# Patient Record
Sex: MALE | Race: BLACK OR AFRICAN AMERICAN
[De-identification: names, ages, dates, MRNs, and addresses within clinical notes are randomized per-mention and may not be internally consistent; named-entity substitution may affect disease eponyms.]

---
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
## 2023-02-16 ENCOUNTER — HOSPITAL ENCOUNTER (EMERGENCY)
Dept: HOSPITAL 74 - JER | Age: 18
Discharge: HOME | End: 2023-02-16
Payer: OTHER GOVERNMENT

## 2023-02-16 VITALS — SYSTOLIC BLOOD PRESSURE: 118 MMHG | TEMPERATURE: 99.9 F | DIASTOLIC BLOOD PRESSURE: 69 MMHG

## 2023-02-16 VITALS — BODY MASS INDEX: 19.2 KG/M2

## 2023-02-16 VITALS — HEART RATE: 91 BPM | RESPIRATION RATE: 16 BRPM

## 2023-02-16 DIAGNOSIS — J03.90: Primary | ICD-10-CM

## 2023-02-16 PROCEDURE — 3E0333Z INTRODUCTION OF ANTI-INFLAMMATORY INTO PERIPHERAL VEIN, PERCUTANEOUS APPROACH: ICD-10-PCS

## 2024-08-13 ENCOUNTER — HOSPITAL ENCOUNTER (EMERGENCY)
Dept: HOSPITAL 74 - JER | Age: 19
LOS: 1 days | Discharge: HOME | End: 2024-08-14
Payer: OTHER GOVERNMENT

## 2024-08-13 VITALS — TEMPERATURE: 98.8 F

## 2024-08-13 VITALS — BODY MASS INDEX: 22.4 KG/M2

## 2024-08-13 DIAGNOSIS — B34.9: ICD-10-CM

## 2024-08-13 DIAGNOSIS — M54.2: Primary | ICD-10-CM

## 2024-08-13 DIAGNOSIS — R50.9: ICD-10-CM

## 2024-08-13 DIAGNOSIS — R05.9: ICD-10-CM

## 2024-08-14 VITALS — HEART RATE: 82 BPM | SYSTOLIC BLOOD PRESSURE: 123 MMHG | RESPIRATION RATE: 16 BRPM | DIASTOLIC BLOOD PRESSURE: 60 MMHG

## 2024-08-14 RX ADMIN — IBUPROFEN ONE MG: 600 TABLET, FILM COATED ORAL at 00:43

## 2024-08-14 RX ADMIN — LIDOCAINE ONE: 50 PATCH TOPICAL at 00:44

## 2024-08-14 RX ADMIN — CYCLOBENZAPRINE HYDROCHLORIDE ONE MG: 5 TABLET, FILM COATED ORAL at 01:43

## 2024-08-14 RX ADMIN — LIDOCAINE PATCH 4% ONE PATCH: 40 PATCH TOPICAL at 00:43
